# Patient Record
Sex: FEMALE | URBAN - METROPOLITAN AREA
[De-identification: names, ages, dates, MRNs, and addresses within clinical notes are randomized per-mention and may not be internally consistent; named-entity substitution may affect disease eponyms.]

---

## 2021-10-08 ENCOUNTER — HOSPITAL ENCOUNTER (EMERGENCY)
Facility: HOSPITAL | Age: 76
Discharge: HOME | End: 2021-10-08

## 2021-10-08 NOTE — ED INITIAL ASSESSMENT (HPI)
Patient states she was discharged from [facility] on Wednesday, patient showed this RN AMA paperwork dated 10/06/2021. Patient states her IV was left in. 20g IV seen to L AC. Patient just requesting IV be pulled. IV pulled with catheter intact.  Bleed